# Patient Record
Sex: FEMALE | Race: WHITE | Employment: UNEMPLOYED | URBAN - METROPOLITAN AREA
[De-identification: names, ages, dates, MRNs, and addresses within clinical notes are randomized per-mention and may not be internally consistent; named-entity substitution may affect disease eponyms.]

---

## 2018-10-01 ENCOUNTER — HOSPITAL ENCOUNTER (EMERGENCY)
Age: 33
Discharge: HOME | End: 2018-10-01
Payer: COMMERCIAL

## 2018-10-01 ENCOUNTER — APPOINTMENT (OUTPATIENT)
Dept: GENERAL RADIOLOGY | Age: 33
End: 2018-10-01
Payer: COMMERCIAL

## 2018-10-01 ENCOUNTER — APPOINTMENT (OUTPATIENT)
Dept: CT IMAGING | Age: 33
End: 2018-10-01
Payer: COMMERCIAL

## 2018-10-01 VITALS
WEIGHT: 128 LBS | TEMPERATURE: 97.6 F | DIASTOLIC BLOOD PRESSURE: 87 MMHG | HEIGHT: 64 IN | HEART RATE: 90 BPM | RESPIRATION RATE: 16 BRPM | OXYGEN SATURATION: 100 % | SYSTOLIC BLOOD PRESSURE: 142 MMHG | BODY MASS INDEX: 21.85 KG/M2

## 2018-10-01 DIAGNOSIS — S00.83XA FACIAL CONTUSION, INITIAL ENCOUNTER: Primary | ICD-10-CM
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
## 2018-10-01 DIAGNOSIS — S40.011A CONTUSION OF RIGHT SHOULDER, INITIAL ENCOUNTER: ICD-10-CM

## 2018-10-01 PROCEDURE — 73030 X-RAY EXAM OF SHOULDER: CPT

## 2018-10-01 PROCEDURE — 6370000000 HC RX 637 (ALT 250 FOR IP): Performed by: PHYSICIAN ASSISTANT

## 2018-10-01 PROCEDURE — 70450 CT HEAD/BRAIN W/O DYE: CPT

## 2018-10-01 PROCEDURE — 99283 EMERGENCY DEPT VISIT LOW MDM: CPT

## 2018-10-01 RX ORDER — IBUPROFEN 600 MG/1
600 TABLET ORAL ONCE
Status: COMPLETED | OUTPATIENT
Start: 2018-10-01 | End: 2018-10-01

## 2018-10-01 RX ORDER — BUTALBITAL, ACETAMINOPHEN AND CAFFEINE 50; 325; 40 MG/1; MG/1; MG/1
1 TABLET ORAL ONCE
Status: COMPLETED | OUTPATIENT
Start: 2018-10-01 | End: 2018-10-01

## 2018-10-01 RX ORDER — IBUPROFEN 600 MG/1
600 TABLET ORAL EVERY 6 HOURS PRN
Qty: 25 TABLET | Refills: 0 | Status: SHIPPED | OUTPATIENT
Start: 2018-10-01 | End: 2021-12-30

## 2018-10-01 RX ORDER — BUTALBITAL, ACETAMINOPHEN AND CAFFEINE 50; 325; 40 MG/1; MG/1; MG/1
1 TABLET ORAL EVERY 6 HOURS PRN
Qty: 20 TABLET | Refills: 0 | Status: SHIPPED | OUTPATIENT
Start: 2018-10-01

## 2018-10-01 RX ADMIN — IBUPROFEN 600 MG: 600 TABLET ORAL at 18:22

## 2018-10-01 RX ADMIN — BUTALBITAL, ACETAMINOPHEN, AND CAFFEINE 1 TABLET: 50; 325; 40 TABLET ORAL at 18:22

## 2018-10-01 ASSESSMENT — PAIN DESCRIPTION - PAIN TYPE: TYPE: ACUTE PAIN

## 2018-10-01 ASSESSMENT — PAIN SCALES - GENERAL
PAINLEVEL_OUTOF10: 9
PAINLEVEL_OUTOF10: 9

## 2018-10-01 ASSESSMENT — PAIN DESCRIPTION - LOCATION: LOCATION: HEAD;SHOULDER

## 2018-10-01 NOTE — ED PROVIDER NOTES
If symptoms worsen      DISCHARGE MEDICATIONS:  New Prescriptions    BUTALBITAL-ACETAMINOPHEN-CAFFEINE (FIORICET, ESGIC) -40 MG PER TABLET    Take 1 tablet by mouth every 6 hours as needed for Headaches    IBUPROFEN (IBU) 600 MG TABLET    Take 1 tablet by mouth every 6 hours as needed for Pain       DISCONTINUED MEDICATIONS:  Discontinued Medications    No medications on file              (Please note the MDM and HPI sections of this note were completed with a voice recognition program.  Efforts were made to edit the dictations but occasionally words are mis-transcribed.)    Electronically signed, Britany Rivera PA-C  10/01/18 [id number]

## 2018-10-01 NOTE — ED TRIAGE NOTES
Chief Complaint   Patient presents with    Shoulder Injury     Pt presents s/p right shoulder injury on Thursday. Notes increasing pain. Sent in by PCP. +right radial pulse. fingers warm to touch. Pt states she was stepped on by a pony. Also notes injury to head. Denies use of blood thinners.+loc. States pain to head is getting worse.

## 2019-05-13 ENCOUNTER — OFFICE VISIT (OUTPATIENT)
Dept: ORTHOPEDIC SURGERY | Age: 34
End: 2019-05-13
Payer: COMMERCIAL

## 2019-05-13 VITALS — BODY MASS INDEX: 21.85 KG/M2 | HEIGHT: 64 IN | WEIGHT: 128 LBS

## 2019-05-13 DIAGNOSIS — M24.411 SHOULDER DISLOCATION, RECURRENT, RIGHT: ICD-10-CM

## 2019-05-13 DIAGNOSIS — M25.511 RIGHT SHOULDER PAIN, UNSPECIFIED CHRONICITY: Primary | ICD-10-CM

## 2019-05-13 PROCEDURE — 1036F TOBACCO NON-USER: CPT | Performed by: ORTHOPAEDIC SURGERY

## 2019-05-13 PROCEDURE — G8420 CALC BMI NORM PARAMETERS: HCPCS | Performed by: ORTHOPAEDIC SURGERY

## 2019-05-13 PROCEDURE — G8427 DOCREV CUR MEDS BY ELIG CLIN: HCPCS | Performed by: ORTHOPAEDIC SURGERY

## 2019-05-13 PROCEDURE — 99203 OFFICE O/P NEW LOW 30 MIN: CPT | Performed by: ORTHOPAEDIC SURGERY

## 2019-05-13 NOTE — PROGRESS NOTES
MD Janna Melgar Esmeralda Carson         Orthopaedic Surgery and Sports Medicine      Patient Name: Saray Trotter  YOB: 1985  Patient's PCP is Shady Weiss MD, MD    SUBJECTIVE  Chief Complaint:  Shoulder Pain (RIGHT)      History of Present Illness:  Saray Trotter is a right handed 28 y.o. female here regarding right shoulder pain. The pain began Oct 2018. There was a history of injury, when she was stepped on by a pony. She reports at that time her shoulder was dislocated and her father \"put it back in place\". She reports this has happened numerous times but has never been seen by physician. She was seen in the emergency department as she did have injuries to her face as well. She has been taking Naprosyn 500 mg b.i.d. and Tylenol p.r.n. which has not helped much. The pain has a diffuse location around the shoulder and is not well localized. She describes the symptoms as aching. She rates pain at 9/10. Symptoms improve with nothing. The symptoms are worse with activity. The shoulder has dislocated/subluxed and/or felt unstable. There is no numbness or tingling in hand/fingers. Sleeping habits related to chief complaint: fair      The patient has not had PT. The patient has not had an injection. The patient has taken NSAIDs. The patient is working, on a farm.       Pain Assessment:  Pain Assessment  Location of Pain: Shoulder  Location Modifiers: Right  Severity of Pain: 9  Quality of Pain: Aching, Popping, Cracking  Frequency of Pain: Intermittent  Aggravating Factors: Stretching, Straightening  Limiting Behavior: No  Relieving Factors: Rest  Result of Injury: Yes  Work-Related Injury: No  Are there other pain locations you wish to document?: No    Past Medical History:  Past Medical History:   Diagnosis Date    Polycystic disease, kidney        Past Load and Shift test: negative                  Impingement test: not tested       Sulcus sign: negative       Bear Hug test: not tested       Lift-Off test: not tested       Cuff Drop Arm test:  not tested    Strength: Forward flexion: 5/5        Abduction: 5/5        Internal Rotation: 5/5        External Rotation: 5/5    Neurologic & Vascular: Sensation to intact to light touch throughout median, ulnar and radial nerve distribution. The bilateral upper extremities are warm and well-perfused with brisk capillary refill. Additional Examinations:  Left Upper Extremity: Examination of the left upper extremity does not show any tenderness, deformity or injury. Range of motion is within normal limits. There is no gross instability. There are no rashes, ulcerations or lesions. Strength and tone are normal.  Neck: Examination of the neck does not show any tenderness, deformity or injury. Range of motion is within normal limits. There is no gross instability. There are no rashes, ulcerations or lesions. Strength and tone are normal.      DIAGNOSTICS:  Xrays obtained in office today: Yes  Xrays reviewed today: Yes  Four views of the right shoulder show   Fracture: No  Dislocation: No  Acromion morphology: Type I   Acromioclavicular joint arthritis: none  Glenohumeral joint arthritis: none  Humeral head superior migration: none        ASSESSMENT (Medical Decision Making)    Marybeth Pereira is a 28 y.o. female with the following diagnosis: Right shoulder possible shoulder instability with possible labral tear      ICD-10-CM    1.  Right shoulder pain, unspecified chronicity M25.511 XR SHOULDER RIGHT (MIN 2 VIEWS)   2. Shoulder dislocation, recurrent, right M24.411        Her overall course is worsening despite conservative treatment      PLAN (Medical Decision Making)  Office Procedures:  Orders Placed This Encounter   Procedures    XR SHOULDER RIGHT (MIN 2 VIEWS)     Standing Status:   Future     Number of Occurrences:   1     Standing Expiration Date:   5/10/2020       Treatment Plan:    I discussed the diagnosis and treatment options with Ken Johnson today. Today, we placed her for the patient for an MRI of her right shoulder to rule out a labral tear. She'll follow-up following her MRI and we can discuss further treatment options at that time. Non-steroidal anti-inflammatories medications (NSAIDs) can be used to assist with pain control and to reduce inflammatory changes. These medications may be over-the-counter or prescribed. We discussed taking the NSAID properly and the precautions. The patient understands that this medication may potentially interfere with other medications. Patient was also instructed to immediately discontinue the medication is there is any possible complication. Ken Johnson was instructed to call the office if her symptoms worsen or if new symptoms appear prior to the next scheduled visit. She is specifically instructed to contact the office between now and schedule appointment if she has concerns related to her condition or if she needs assistance in scheduling any above tests. She is welcome to call for an appointment sooner if she has any additional concerns or questions. Magaly Hernández PA-C, scribing for and in the presence of Dr. Sofia Silva   5/13/2019 9:57 AM    I have evaluated the patient myself and completed the examination of the patient on date of visit. I have discussed the case and reviewed all pertinent data with the Magaly COVARRUBIAS, and agree with the plan noted above. Dr. Sofia Silva MD      This dictation was performed with a verbal recognition program [facility]) and it was checked for errors. It is possible that there are still dictated errors within this office note. If so, please bring any errors to my attention for an addendum. All efforts were made to ensure that this office note is accurate.

## 2019-06-14 ENCOUNTER — OFFICE VISIT (OUTPATIENT)
Dept: ORTHOPEDIC SURGERY | Age: 34
End: 2019-06-14
Payer: COMMERCIAL

## 2019-06-14 VITALS — HEIGHT: 64 IN | BODY MASS INDEX: 21.85 KG/M2 | WEIGHT: 128 LBS

## 2019-06-14 DIAGNOSIS — M25.311 INSTABILITY OF RIGHT SHOULDER JOINT: ICD-10-CM

## 2019-06-14 DIAGNOSIS — M25.511 RIGHT SHOULDER PAIN, UNSPECIFIED CHRONICITY: Primary | ICD-10-CM

## 2019-06-14 PROCEDURE — 1036F TOBACCO NON-USER: CPT | Performed by: PHYSICIAN ASSISTANT

## 2019-06-14 PROCEDURE — 99213 OFFICE O/P EST LOW 20 MIN: CPT | Performed by: PHYSICIAN ASSISTANT

## 2019-06-14 PROCEDURE — G8420 CALC BMI NORM PARAMETERS: HCPCS | Performed by: PHYSICIAN ASSISTANT

## 2019-06-14 PROCEDURE — G8427 DOCREV CUR MEDS BY ELIG CLIN: HCPCS | Performed by: PHYSICIAN ASSISTANT

## 2019-06-14 NOTE — PROGRESS NOTES
MD Susan Shaw, [place]         Orthopaedic Surgery and Sports Medicine      Patient Name: Michelle Cummings  YOB: 1985  Patient's PCP is Mary Grace Campbell MD, MD    SUBJECTIVE  Chief Complaint:  Shoulder Pain (RIGHT     )      History of Present Illness:  Michelle Cummings is a right handed 28 y.o. female here regarding right shoulder pain. The pain began Oct 2018. There was a history of injury, when she was stepped on by a pony. She reports at that time her shoulder was dislocated and her father \"put it back in place\". She reports this has happened numerous times but has never been seen by physician until now. She was seen in the emergency department as she did have injuries to her face as well. She has been taking Naprosyn 500 mg b.i.d. and Tylenol p.r.n. which has not helped much. The pain has a diffuse location around the shoulder and is not well localized. She describes the symptoms as aching. She rates pain at 10/10. Symptoms improve with nothing. The symptoms are worse with activity. The shoulder has dislocated/subluxed and/or felt unstable. There is no numbness or tingling in hand/fingers. Sleeping habits related to chief complaint: fair      The patient has not had PT. The patient has not had an injection. The patient has taken NSAIDs. The patient is working, on a farm. At her last visit we referred her for an MRI scan and she is here to go over those results. She reports her symptoms are essentially unchanged. She has been having some elbow pain and has an appointment set up with Dr. Ralf Bush to have that evaluated.       Pain Assessment:  Pain Assessment  Location of Pain: Shoulder  Location Modifiers: Right  Severity of Pain: 10  Quality of Pain: Dull, Aching  Frequency of Pain: Intermittent  Aggravating Factors: Stretching, Straightening, Exercise  Relieving Factors: Rest  Result of Injury: Yes  Work-Related Injury: No  Are there other pain locations you wish to document?: No    Past Medical History:  Past Medical History:   Diagnosis Date    Polycystic disease, kidney        Past Surgical History:  Past Surgical History:   Procedure Laterality Date    LEEP      TUBAL LIGATION  2013       Allergies: Allergies   Allergen Reactions    Claritin-D [Loratadine-Pseudoephedrine Er]      Broke out into rash and hives       Medications:  Current Outpatient Medications   Medication Sig Dispense Refill    Naproxen Sodium (ALEVE PO) Take by mouth      butalbital-acetaminophen-caffeine (FIORICET, ESGIC) -40 MG per tablet Take 1 tablet by mouth every 6 hours as needed for Headaches 20 tablet 0    ibuprofen (IBU) 600 MG tablet Take 1 tablet by mouth every 6 hours as needed for Pain 25 tablet 0     No current facility-administered medications for this visit. Review of Systems:  Nicolasa Marie's review of systems has been performed by intake and observation. All past and current ROS forms have been scanned into the medical record. She has been instructed to contact her primary care provider regarding ROS issues if not already being addressed at this time. There are no recent changes. The most recent ROS was scanned into media on 05/13/2019       OBJECTIVE  PHYSICAL EXAM  Vital Signs: There were no vitals filed for this visit. Body mass index is 21.97 kg/m². General Exam:   Constitutional: Patient is adequately groomed with no evidence of malnutrition  Mental Status: The patient is oriented to time, place and person. The patient's mood and affect are appropriate. Neurological: The patient has good coordination. There is no weakness or sensory deficit. Right Shoulder Examination  Inspection:    Visual deformity noted: No    no swelling noted. No erythema or ecchymosis.      Skin is intact with no cellulitis, rashes, ulcerations, lymphedema     or cutaneous lesions noted. Palpation:  mild tenderness to palpation on the anterior region. Range of Motion:  Full, but painful at the extremes    Special Tests:  Obriens test: positive       Apprehension test: negative       Load and Shift test: negative                  Impingement test: not tested       Sulcus sign: negative       Bear Hug test: not tested       Lift-Off test: not tested       Cuff Drop Arm test:  not tested    Strength: Forward flexion: 5/5        Abduction: 5/5        Internal Rotation: 5/5        External Rotation: 5/5    Neurologic & Vascular: Sensation to intact to light touch throughout median, ulnar and radial nerve distribution. The bilateral upper extremities are warm and well-perfused with brisk capillary refill. Additional Examinations:  Left Upper Extremity: Examination of the left upper extremity does not show any tenderness, deformity or injury. Range of motion is within normal limits. There is no gross instability. There are no rashes, ulcerations or lesions. Strength and tone are normal.  Neck: Examination of the neck does not show any tenderness, deformity or injury. Range of motion is within normal limits. There is no gross instability. There are no rashes, ulcerations or lesions. Strength and tone are normal.      DIAGNOSTICS:  Xrays obtained in office today: No  Xrays reviewed today: Yes  Four views of the right shoulder show   Fracture: No  Dislocation: No  Acromion morphology: Type I   Acromioclavicular joint arthritis: none  Glenohumeral joint arthritis: none  Humeral head superior migration: none    MRI of the right shoulder from May 17, 2019 shows a \"normal study\". No labral tear. No rotator cuff tear. ASSESSMENT (Medical Decision Making)    Black Restrepo is a 28 y.o. female with the following diagnosis: Right shoulder pain with self-reported instability      ICD-10-CM    1.  Right shoulder pain, unspecified there are still dictated errors within this office note. If so, please bring any errors to my attention for an addendum. All efforts were made to ensure that this office note is accurate.

## 2019-06-24 ENCOUNTER — OFFICE VISIT (OUTPATIENT)
Dept: ORTHOPEDIC SURGERY | Age: 34
End: 2019-06-24
Payer: COMMERCIAL

## 2019-06-24 VITALS — HEIGHT: 64 IN | WEIGHT: 128.75 LBS | BODY MASS INDEX: 21.98 KG/M2

## 2019-06-24 DIAGNOSIS — M25.521 RIGHT ELBOW PAIN: Primary | ICD-10-CM

## 2019-06-24 PROCEDURE — 1036F TOBACCO NON-USER: CPT | Performed by: ORTHOPAEDIC SURGERY

## 2019-06-24 PROCEDURE — 99213 OFFICE O/P EST LOW 20 MIN: CPT | Performed by: ORTHOPAEDIC SURGERY

## 2019-06-24 PROCEDURE — G8420 CALC BMI NORM PARAMETERS: HCPCS | Performed by: ORTHOPAEDIC SURGERY

## 2019-06-24 PROCEDURE — G8427 DOCREV CUR MEDS BY ELIG CLIN: HCPCS | Performed by: ORTHOPAEDIC SURGERY

## 2019-06-24 NOTE — PROGRESS NOTES
Chief Complaint  Elbow Pain (right elbow injured on 9/2018, ran over by a pony during a horse show, no treatment to date)      History of Present Illness:  She Bear is a 28 y.o. female right-hand-dominant, , who presents for right elbow pain of 9 months which began after being trampled by a pony, she had immediate pain in the right shoulder, right elbow and had facial trauma. She underwent a procedure for a dislocation of the right shoulder. Her pain in the elbow has been posterior and posterior lateral.  She has on and off swelling. She denies drainage or signs of infection. She denies locking of the elbow. Pain causes weakness and fatigue, no numbness in the hand.   She has tried activity modifications, oral NSAIDs along with range of motion and stretching    Patient states she has not done well with cortisone injections in the past.    Medical History  Past Medical History:   Diagnosis Date    Polycystic disease, kidney      Past Surgical History:   Procedure Laterality Date    LEEP      TUBAL LIGATION  2013     Family History   Problem Relation Age of Onset    Kidney Disease Mother     Kidney Disease Sister     Diabetes Paternal Grandfather      Social History     Socioeconomic History    Marital status: Single     Spouse name: Not on file    Number of children: Not on file    Years of education: Not on file    Highest education level: Not on file   Occupational History    Not on file   Social Needs    Financial resource strain: Not on file    Food insecurity:     Worry: Not on file     Inability: Not on file    Transportation needs:     Medical: Not on file     Non-medical: Not on file   Tobacco Use    Smoking status: Former Smoker     Packs/day: 0.50     Years: 0.50     Pack years: 0.25     Types: Cigarettes    Smokeless tobacco: Never Used   Substance and Sexual Activity    Alcohol use: No    Drug use: No    Sexual activity: Yes     Partners: Male   Lifestyle    Physical activity:     Days per week: Not on file     Minutes per session: Not on file    Stress: Not on file   Relationships    Social connections:     Talks on phone: Not on file     Gets together: Not on file     Attends Orthodoxy service: Not on file     Active member of club or organization: Not on file     Attends meetings of clubs or organizations: Not on file     Relationship status: Not on file    Intimate partner violence:     Fear of current or ex partner: Not on file     Emotionally abused: Not on file     Physically abused: Not on file     Forced sexual activity: Not on file   Other Topics Concern    Not on file   Social History Narrative    Not on file     Current Outpatient Medications   Medication Sig Dispense Refill    Naproxen Sodium (ALEVE PO) Take by mouth      ibuprofen (IBU) 600 MG tablet Take 1 tablet by mouth every 6 hours as needed for Pain 25 tablet 0    butalbital-acetaminophen-caffeine (FIORICET, ESGIC) -40 MG per tablet Take 1 tablet by mouth every 6 hours as needed for Headaches 20 tablet 0     No current facility-administered medications for this visit. Allergies   Allergen Reactions    Claritin-D [Loratadine-Pseudoephedrine Er]      Broke out into rash and hives       Review of Systems  Relevant review of systems reviewed and available in the patient's chart    Vital Signs  There were no vitals filed for this visit. General Exam:   Constitutional: Patient is adequately groomed with no evidence of malnutrition  Mental Status: The patient is oriented to time, place and person. The patient's mood and affect are appropriate. Lymphatic: The lymphatic examination bilaterally reveals all areas to be without enlargement or induration. Neurological: The patient has good coordination. There is no weakness or sensory deficit. Elbow Examination - Right  Inspection: Small area posteriorly of raised tissue consistent with a mild chronic bursitis.   No erythema or drainage today. Palpation: Tenderness to palpation over the area of bursa, tenderness palpation posterior lateral    Range of Motion: Good motion of the elbow without clicking or instability    Strength: Intact flexion and extension of the elbow. Special Tests: No lymphadenopathy. Normal biceps and triceps. No varus valgus instability    Skin: There are no additional worrisome rashes, ulcerations or lesions. Gait: normal    Circulation normal      Radiology:     X-rays obtained and reviewed in office:  Views 3  Location right elbow  Impression :  Overall good alignment of the elbow joint without obvious fracture, dislocation or loose body      Assessment: Right elbow pain    Impression:  Encounter Diagnosis   Name Primary?  Right elbow pain Yes       Office Procedures:  Orders Placed This Encounter   Procedures    XR ELBOW RIGHT (MIN 3 VIEWS)     Standing Status:   Future     Number of Occurrences:   1     Standing Expiration Date:   6/21/2020       Treatment Plan: Recommendation made for MRI at this point to rule out infectious or septic bursitis of the right elbow, rule out loose body or other internal derangement. She has tried conservative measures over the past 9 months. Follow-up after MRI to discuss results and further treatment options. All questions and concerns were addressed today. Patient is in agreement with the plan. Chica Kc MD  Hand & Upper Extremity Surgery  [address]  A partner of [facility] ([facility])        Please note that this transcription was created using voice recognition software. Any errors are unintentional and may be due to voice recognition transcription.

## 2019-07-02 ENCOUNTER — TELEPHONE (OUTPATIENT)
Dept: ORTHOPEDIC SURGERY | Age: 34
End: 2019-07-02

## 2021-12-30 ENCOUNTER — HOSPITAL ENCOUNTER (EMERGENCY)
Age: 36
Discharge: HOME | End: 2021-12-30
Attending: EMERGENCY MEDICINE
Payer: COMMERCIAL

## 2021-12-30 VITALS
BODY MASS INDEX: 21.34 KG/M2 | DIASTOLIC BLOOD PRESSURE: 80 MMHG | TEMPERATURE: 98.4 F | WEIGHT: 125 LBS | RESPIRATION RATE: 16 BRPM | HEART RATE: 90 BPM | OXYGEN SATURATION: 97 % | SYSTOLIC BLOOD PRESSURE: 107 MMHG | HEIGHT: 64 IN

## 2021-12-30 DIAGNOSIS — S61.011A LACERATION OF RIGHT THUMB WITHOUT FOREIGN BODY WITHOUT DAMAGE TO NAIL, INITIAL ENCOUNTER: Primary | ICD-10-CM

## 2021-12-30 PROCEDURE — 90471 IMMUNIZATION ADMIN: CPT | Performed by: EMERGENCY MEDICINE

## 2021-12-30 PROCEDURE — 99283 EMERGENCY DEPT VISIT LOW MDM: CPT

## 2021-12-30 PROCEDURE — 6370000000 HC RX 637 (ALT 250 FOR IP): Performed by: EMERGENCY MEDICINE

## 2021-12-30 PROCEDURE — 6360000002 HC RX W HCPCS: Performed by: EMERGENCY MEDICINE

## 2021-12-30 PROCEDURE — 90715 TDAP VACCINE 7 YRS/> IM: CPT | Performed by: EMERGENCY MEDICINE

## 2021-12-30 RX ORDER — CEPHALEXIN 500 MG/1
500 CAPSULE ORAL ONCE
Status: COMPLETED | OUTPATIENT
Start: 2021-12-30 | End: 2021-12-30

## 2021-12-30 RX ORDER — CEPHALEXIN 500 MG/1
500 CAPSULE ORAL 3 TIMES DAILY
Qty: 15 CAPSULE | Refills: 0 | Status: SHIPPED | OUTPATIENT
Start: 2021-12-30 | End: 2022-01-04

## 2021-12-30 RX ADMIN — CEPHALEXIN 500 MG: 500 CAPSULE ORAL at 23:04

## 2021-12-30 RX ADMIN — TETANUS TOXOID, REDUCED DIPHTHERIA TOXOID AND ACELLULAR PERTUSSIS VACCINE, ADSORBED 0.5 ML: 5; 2.5; 8; 8; 2.5 SUSPENSION INTRAMUSCULAR at 23:03

## 2021-12-30 ASSESSMENT — PAIN DESCRIPTION - LOCATION: LOCATION: FINGER (COMMENT WHICH ONE)

## 2021-12-30 ASSESSMENT — ENCOUNTER SYMPTOMS
NAUSEA: 0
TROUBLE SWALLOWING: 0
VOMITING: 0
SHORTNESS OF BREATH: 0
DIARRHEA: 0
VOICE CHANGE: 0

## 2021-12-30 ASSESSMENT — PAIN SCALES - GENERAL
PAINLEVEL_OUTOF10: 3
PAINLEVEL_OUTOF10: 10

## 2021-12-30 ASSESSMENT — PAIN DESCRIPTION - ORIENTATION: ORIENTATION: RIGHT

## 2021-12-30 ASSESSMENT — PAIN DESCRIPTION - PAIN TYPE: TYPE: ACUTE PAIN

## 2021-12-31 NOTE — ED PROVIDER NOTES
BUTALBITAL-ACETAMINOPHEN-CAFFEINE (FIORICET, ESGIC) -40 MG PER TABLET    Take 1 tablet by mouth every 6 hours as needed for Headaches    NAPROXEN SODIUM (ALEVE PO)    Take by mouth       ALLERGIES     Claritin-d [loratadine-pseudoephedrine er]    FAMILY HISTORY       Family History   Problem Relation Age of Onset    Kidney Disease Mother     Kidney Disease Sister     Diabetes Paternal Grandfather           SOCIAL HISTORY       Social History     Socioeconomic History    Marital status: Single     Spouse name: None    Number of children: None    Years of education: None    Highest education level: None   Occupational History    None   Tobacco Use    Smoking status: Former Smoker     Packs/day: 0.50     Years: 0.50     Pack years: 0.25     Types: Cigarettes    Smokeless tobacco: Never Used   Vaping Use    Vaping Use: Never used   Substance and Sexual Activity    Alcohol use: No    Drug use: No    Sexual activity: Yes     Partners: Male   Other Topics Concern    None   Social History Narrative    None     Social Determinants of Health     Financial Resource Strain:     Difficulty of Paying Living Expenses: Not on file   Food Insecurity:     Worried About Running Out of Food in the Last Year: Not on file    Cr of Food in the Last Year: Not on file   Transportation Needs:     Lack of Transportation (Medical): Not on file    Lack of Transportation (Non-Medical):  Not on file   Physical Activity:     Days of Exercise per Week: Not on file    Minutes of Exercise per Session: Not on file   Stress:     Feeling of Stress : Not on file   Social Connections:     Frequency of Communication with Friends and Family: Not on file    Frequency of Social Gatherings with Friends and Family: Not on file    Attends Buddhism Services: Not on file    Active Member of Clubs or Organizations: Not on file    Attends Club or Organization Meetings: Not on file    Marital Status: Not on file   Intimate Partner Violence:     Fear of Current or Ex-Partner: Not on file    Emotionally Abused: Not on file    Physically Abused: Not on file    Sexually Abused: Not on file   Housing Stability:     Unable to Pay for Housing in the Last Year: Not on file    Number of [place] in the Last Year: Not on file    Unstable Housing in the Last Year: Not on file         PHYSICAL EXAM    (up to 7 for level 4, 8 or more for level 5)     ED Triage Vitals [12/30/21 1906]   BP Temp Temp Source Pulse Resp SpO2 Height Weight   121/76 98.4 °F (36.9 °C) Oral 76 16 99 % 5' 4\" (1.626 m) 125 lb (56.7 kg)       Physical Exam  Constitutional:       General: She is not in acute distress. Appearance: She is well-developed. HENT:      Head: Normocephalic and atraumatic. Eyes:      Conjunctiva/sclera: Conjunctivae normal.   Neck:      Vascular: No JVD. Cardiovascular:      Rate and Rhythm: Normal rate. Comments: 2+ radial ulnar pulses to right upper extremity. Normal cap refill to right thumb  Pulmonary:      Effort: Pulmonary effort is normal. No respiratory distress. Abdominal:      Tenderness: There is no abdominal tenderness. There is no rebound. Musculoskeletal:         General: Tenderness and signs of injury present. Comments: Long but superficial vertical laceration to palmar surface of right thumb, approximately 4 cm. No palpable foreign body. Full flexion and extension with isolation of each joint   Neurological:      Mental Status: She is alert. Comments: Sensation and motor function intact in radial, median, ulnar nerve distribution of the right upper extremity. EMERGENCY DEPARTMENT COURSE and DIFFERENTIAL DIAGNOSIS/MDM:   Vitals:    Vitals:    12/30/21 1906 12/30/21 2241   BP: 121/76 107/80   Pulse: 76 90   Resp: 16 16   Temp: 98.4 °F (36.9 °C)    TempSrc: Oral    SpO2: 99% 97%   Weight: 125 lb (56.7 kg)    Height: 5' 4\" (1.626 m)          MDM  All vital signs are within normal limits. I feel patient is appropriate for thorough wound cleaning, tetanus update, Keflex, daily bandage changes, and allowing the wound to heal by secondary intention. Strict ER return precautions are given for fever, pus drainage, numbness or weakness. Patient expresses understanding and agreement with this plan and is discharged home. I do not suspect tendon laceration. Procedures    FINAL IMPRESSION      1. Laceration of right thumb without foreign body without damage to nail, initial encounter          DISPOSITION/PLAN   DISPOSITION Decision To Discharge 12/30/2021 10:46:04 PM      PATIENT REFERRED TO:  Kulwinder Montano MD  [address] [facility] [address]    In 1 week      [place] Emergency Department  [address] [place] [address],[address]  [phone]    If symptoms worsen      DISCHARGE MEDICATIONS:  New Prescriptions    CEPHALEXIN (KEFLEX) 500 MG CAPSULE    Take 1 capsule by mouth 3 times daily for 5 days          (Please note that portions of this note were completed with a voice recognition program.  Efforts were made to edit the dictations but occasionally words aremis-transcribed. )    Pankaj Velez MD (electronically signed)  Attending Emergency Physician           Pankaj Velez MD  12/30/21 [id number]